# Patient Record
Sex: MALE | Race: WHITE | NOT HISPANIC OR LATINO | Employment: FULL TIME | URBAN - METROPOLITAN AREA
[De-identification: names, ages, dates, MRNs, and addresses within clinical notes are randomized per-mention and may not be internally consistent; named-entity substitution may affect disease eponyms.]

---

## 2019-12-21 ENCOUNTER — APPOINTMENT (OUTPATIENT)
Dept: CT IMAGING | Facility: HOSPITAL | Age: 54
End: 2019-12-21

## 2019-12-21 ENCOUNTER — HOSPITAL ENCOUNTER (EMERGENCY)
Facility: HOSPITAL | Age: 54
Discharge: HOME | End: 2019-12-21
Attending: EMERGENCY MEDICINE | Admitting: EMERGENCY MEDICINE

## 2019-12-21 ENCOUNTER — APPOINTMENT (OUTPATIENT)
Dept: GENERAL RADIOLOGY | Facility: HOSPITAL | Age: 54
End: 2019-12-21

## 2019-12-21 VITALS
BODY MASS INDEX: 28.17 KG/M2 | HEIGHT: 64 IN | WEIGHT: 165 LBS | RESPIRATION RATE: 16 BRPM | DIASTOLIC BLOOD PRESSURE: 80 MMHG | SYSTOLIC BLOOD PRESSURE: 140 MMHG | HEART RATE: 78 BPM | OXYGEN SATURATION: 96 % | TEMPERATURE: 98.3 F

## 2019-12-21 DIAGNOSIS — A08.4 VIRAL GASTROENTERITIS: Primary | ICD-10-CM

## 2019-12-21 LAB
ALBUMIN SERPL-MCNC: 4.4 G/DL (ref 3.5–5.2)
ALBUMIN/GLOB SERPL: 1.3 G/DL
ALP SERPL-CCNC: 92 U/L (ref 39–117)
ALT SERPL W P-5'-P-CCNC: 15 U/L (ref 1–41)
ANION GAP SERPL CALCULATED.3IONS-SCNC: 12 MMOL/L (ref 5–15)
AST SERPL-CCNC: 15 U/L (ref 1–40)
BACTERIA UR QL AUTO: ABNORMAL /HPF
BASOPHILS # BLD AUTO: 0.01 10*3/MM3 (ref 0–0.2)
BASOPHILS NFR BLD AUTO: 0.1 % (ref 0–1.5)
BILIRUB SERPL-MCNC: 1.2 MG/DL (ref 0.2–1.2)
BILIRUB UR QL STRIP: NEGATIVE
BUN BLD-MCNC: 17 MG/DL (ref 6–20)
BUN/CREAT SERPL: 16.2 (ref 7–25)
C DIFF TOX GENS STL QL NAA+PROBE: NOT DETECTED
CALCIUM SPEC-SCNC: 9.6 MG/DL (ref 8.6–10.5)
CHLORIDE SERPL-SCNC: 103 MMOL/L (ref 98–107)
CLARITY UR: ABNORMAL
CO2 SERPL-SCNC: 26 MMOL/L (ref 22–29)
COD CRY URNS QL: ABNORMAL /HPF
COLOR UR: ABNORMAL
CREAT BLD-MCNC: 1.05 MG/DL (ref 0.76–1.27)
D-LACTATE SERPL-SCNC: 0.9 MMOL/L (ref 0.5–2)
DEPRECATED RDW RBC AUTO: 39.4 FL (ref 37–54)
EOSINOPHIL # BLD AUTO: 0.1 10*3/MM3 (ref 0–0.4)
EOSINOPHIL NFR BLD AUTO: 1 % (ref 0.3–6.2)
ERYTHROCYTE [DISTWIDTH] IN BLOOD BY AUTOMATED COUNT: 12.3 % (ref 12.3–15.4)
GFR SERPL CREATININE-BSD FRML MDRD: 73 ML/MIN/1.73
GLOBULIN UR ELPH-MCNC: 3.3 GM/DL
GLUCOSE BLD-MCNC: 134 MG/DL (ref 65–99)
GLUCOSE UR STRIP-MCNC: NEGATIVE MG/DL
HCT VFR BLD AUTO: 51.5 % (ref 37.5–51)
HGB BLD-MCNC: 18 G/DL (ref 13–17.7)
HGB UR QL STRIP.AUTO: NEGATIVE
HOLD SPECIMEN: NORMAL
HOLD SPECIMEN: NORMAL
HYALINE CASTS UR QL AUTO: ABNORMAL /LPF
IMM GRANULOCYTES # BLD AUTO: 0.04 10*3/MM3 (ref 0–0.05)
IMM GRANULOCYTES NFR BLD AUTO: 0.4 % (ref 0–0.5)
KETONES UR QL STRIP: ABNORMAL
LEUKOCYTE ESTERASE UR QL STRIP.AUTO: NEGATIVE
LIPASE SERPL-CCNC: 29 U/L (ref 13–60)
LYMPHOCYTES # BLD AUTO: 0.95 10*3/MM3 (ref 0.7–3.1)
LYMPHOCYTES NFR BLD AUTO: 9.1 % (ref 19.6–45.3)
MCH RBC QN AUTO: 30.7 PG (ref 26.6–33)
MCHC RBC AUTO-ENTMCNC: 35 G/DL (ref 31.5–35.7)
MCV RBC AUTO: 87.9 FL (ref 79–97)
MONOCYTES # BLD AUTO: 0.72 10*3/MM3 (ref 0.1–0.9)
MONOCYTES NFR BLD AUTO: 6.9 % (ref 5–12)
MUCOUS THREADS URNS QL MICRO: ABNORMAL /HPF
NEUTROPHILS # BLD AUTO: 8.67 10*3/MM3 (ref 1.7–7)
NEUTROPHILS NFR BLD AUTO: 82.5 % (ref 42.7–76)
NITRITE UR QL STRIP: NEGATIVE
NRBC BLD AUTO-RTO: 0 /100 WBC (ref 0–0.2)
PH UR STRIP.AUTO: 5.5 [PH] (ref 5–8)
PLATELET # BLD AUTO: 204 10*3/MM3 (ref 140–450)
PMV BLD AUTO: 9.5 FL (ref 6–12)
POTASSIUM BLD-SCNC: 3.8 MMOL/L (ref 3.5–5.2)
PROT SERPL-MCNC: 7.7 G/DL (ref 6–8.5)
PROT UR QL STRIP: ABNORMAL
RBC # BLD AUTO: 5.86 10*6/MM3 (ref 4.14–5.8)
RBC # UR: ABNORMAL /HPF
REF LAB TEST METHOD: ABNORMAL
SODIUM BLD-SCNC: 141 MMOL/L (ref 136–145)
SP GR UR STRIP: 1.03 (ref 1–1.03)
SQUAMOUS #/AREA URNS HPF: ABNORMAL /HPF
TROPONIN T SERPL-MCNC: <0.01 NG/ML (ref 0–0.03)
UROBILINOGEN UR QL STRIP: ABNORMAL
WBC NRBC COR # BLD: 10.49 10*3/MM3 (ref 3.4–10.8)
WBC UR QL AUTO: ABNORMAL /HPF
WHOLE BLOOD HOLD SPECIMEN: NORMAL
WHOLE BLOOD HOLD SPECIMEN: NORMAL

## 2019-12-21 PROCEDURE — 83605 ASSAY OF LACTIC ACID: CPT | Performed by: EMERGENCY MEDICINE

## 2019-12-21 PROCEDURE — 96374 THER/PROPH/DIAG INJ IV PUSH: CPT

## 2019-12-21 PROCEDURE — 99284 EMERGENCY DEPT VISIT MOD MDM: CPT

## 2019-12-21 PROCEDURE — 74176 CT ABD & PELVIS W/O CONTRAST: CPT

## 2019-12-21 PROCEDURE — 85025 COMPLETE CBC W/AUTO DIFF WBC: CPT | Performed by: EMERGENCY MEDICINE

## 2019-12-21 PROCEDURE — 83690 ASSAY OF LIPASE: CPT | Performed by: EMERGENCY MEDICINE

## 2019-12-21 PROCEDURE — 84484 ASSAY OF TROPONIN QUANT: CPT | Performed by: EMERGENCY MEDICINE

## 2019-12-21 PROCEDURE — 81001 URINALYSIS AUTO W/SCOPE: CPT | Performed by: EMERGENCY MEDICINE

## 2019-12-21 PROCEDURE — 25010000002 PROMETHAZINE PER 50 MG: Performed by: PHYSICIAN ASSISTANT

## 2019-12-21 PROCEDURE — 93005 ELECTROCARDIOGRAM TRACING: CPT | Performed by: EMERGENCY MEDICINE

## 2019-12-21 PROCEDURE — 80053 COMPREHEN METABOLIC PANEL: CPT | Performed by: EMERGENCY MEDICINE

## 2019-12-21 PROCEDURE — 96375 TX/PRO/DX INJ NEW DRUG ADDON: CPT

## 2019-12-21 PROCEDURE — 87493 C DIFF AMPLIFIED PROBE: CPT | Performed by: PHYSICIAN ASSISTANT

## 2019-12-21 PROCEDURE — 71045 X-RAY EXAM CHEST 1 VIEW: CPT

## 2019-12-21 RX ORDER — PROMETHAZINE HYDROCHLORIDE 25 MG/ML
12.5 INJECTION, SOLUTION INTRAMUSCULAR; INTRAVENOUS ONCE
Status: COMPLETED | OUTPATIENT
Start: 2019-12-21 | End: 2019-12-21

## 2019-12-21 RX ORDER — SODIUM CHLORIDE 0.9 % (FLUSH) 0.9 %
10 SYRINGE (ML) INJECTION AS NEEDED
Status: DISCONTINUED | OUTPATIENT
Start: 2019-12-21 | End: 2019-12-21 | Stop reason: HOSPADM

## 2019-12-21 RX ORDER — ONDANSETRON 4 MG/1
4 TABLET, ORALLY DISINTEGRATING ORAL EVERY 8 HOURS PRN
Qty: 12 TABLET | Refills: 0 | Status: SHIPPED | OUTPATIENT
Start: 2019-12-21

## 2019-12-21 RX ORDER — ATENOLOL 25 MG/1
25 TABLET ORAL DAILY
COMMUNITY

## 2019-12-21 RX ORDER — OMEPRAZOLE 20 MG/1
20 CAPSULE, DELAYED RELEASE ORAL DAILY
COMMUNITY

## 2019-12-21 RX ORDER — FAMOTIDINE 10 MG/ML
20 INJECTION, SOLUTION INTRAVENOUS ONCE
Status: COMPLETED | OUTPATIENT
Start: 2019-12-21 | End: 2019-12-21

## 2019-12-21 RX ORDER — LOSARTAN POTASSIUM 50 MG/1
50 TABLET ORAL DAILY
COMMUNITY

## 2019-12-21 RX ADMIN — PROMETHAZINE HYDROCHLORIDE 12.5 MG: 25 INJECTION INTRAMUSCULAR; INTRAVENOUS at 08:22

## 2019-12-21 RX ADMIN — FAMOTIDINE 20 MG: 10 INJECTION, SOLUTION INTRAVENOUS at 08:22

## 2019-12-21 RX ADMIN — SODIUM CHLORIDE 1000 ML: 9 INJECTION, SOLUTION INTRAVENOUS at 08:21

## 2019-12-21 NOTE — ED PROVIDER NOTES
Subjective   Mr. Moore is a 54-year-old male that presents to the emergency room today with nausea vomiting and diarrhea.  He states that is been going on for about 3 days.  He is been having more diarrhea than anything at this point.  He states that he is been having more than 20 episodes of diarrhea a day.  He has not been on any recent antibiotics, he has not had a prior history of C. difficile.  He does report having abdominal pain diffusely more in the epigastrium.  Nausea and vomiting is been minimal at this point.  He has tried Zofran and Phenergan without any relief.  No blood in his stools, no black tarry stools, no prior history of EGD.  He is on a PPI.      History provided by:  Patient   used: No    Diarrhea   The primary symptoms include abdominal pain, nausea, vomiting and diarrhea. Primary symptoms do not include fever, jaundice, hematochezia, dysuria, arthralgias or rash. The illness began 3 to 5 days ago. The onset was sudden.   The illness is also significant for bloating. The illness does not include chills, dysphagia, odynophagia, constipation, back pain or itching. Associated medical issues do not include inflammatory bowel disease, liver disease, alcohol abuse, PUD, irritable bowel syndrome or diverticulitis.       Review of Systems   Constitutional: Negative for chills and fever.   Respiratory: Negative for chest tightness, shortness of breath and wheezing.    Cardiovascular: Negative for chest pain and palpitations.   Gastrointestinal: Positive for abdominal pain, bloating, diarrhea, nausea and vomiting. Negative for constipation, dysphagia, hematochezia and jaundice.   Genitourinary: Negative for dysuria, frequency and hematuria.   Musculoskeletal: Negative for arthralgias, back pain and neck pain.   Skin: Negative for itching and rash.   Hematological: Negative for adenopathy.   Psychiatric/Behavioral: Negative.    All other systems reviewed and are negative.      Past  Medical History:   Diagnosis Date   • GERD (gastroesophageal reflux disease)    • Hypertension        No Known Allergies    Past Surgical History:   Procedure Laterality Date   • ANTERIOR CERVICAL DISCECTOMY         History reviewed. No pertinent family history.    Social History     Socioeconomic History   • Marital status:      Spouse name: Not on file   • Number of children: Not on file   • Years of education: Not on file   • Highest education level: Not on file   Tobacco Use   • Smoking status: Never Smoker   • Smokeless tobacco: Never Used   Substance and Sexual Activity   • Alcohol use: Never     Frequency: Never   • Drug use: Never           Objective   Physical Exam   Constitutional: He is oriented to person, place, and time. He appears well-developed and well-nourished.   HENT:   Head: Normocephalic and atraumatic.   Right Ear: External ear normal.   Left Ear: External ear normal.   Nose: Nose normal.   Mouth/Throat: Oropharynx is clear and moist.   Eyes: Pupils are equal, round, and reactive to light. Conjunctivae and EOM are normal. No scleral icterus.   Neck: Normal range of motion. No thyromegaly present.   Cardiovascular: Normal rate, regular rhythm and normal heart sounds.   Pulmonary/Chest: Effort normal and breath sounds normal. No respiratory distress. He has no wheezes. He has no rales. He exhibits no tenderness.   Abdominal: Soft. Bowel sounds are normal. He exhibits no distension. There is tenderness (diffuse non focal tenderness, negavite tomlinson's sign. ).   Musculoskeletal: Normal range of motion.   Lymphadenopathy:     He has no cervical adenopathy.   Neurological: He is alert and oriented to person, place, and time. He has normal reflexes. He displays normal reflexes. No cranial nerve deficit. Coordination normal.   Skin: Skin is warm and dry. Capillary refill takes less than 2 seconds.   Psychiatric: He has a normal mood and affect. His behavior is normal. Judgment and thought  content normal.   Nursing note and vitals reviewed.      Procedures           ED Course  ED Course as of Dec 22 0728   Sat Dec 21, 2019   1115 CXR no infiltrate, no effusion.  CT abd/pelvis no free air, no obstruction.      [RENE]   1116 Reviewed findings with the patient and the patient's family.  Is able to keep fluids down here in the emergency department.    [RENE]      ED Course User Index  [RENE] [place] PA           Recent Results (from the past 24 hour(s))   Comprehensive Metabolic Panel    Collection Time: 12/21/19  7:30 AM   Result Value Ref Range    Glucose 134 (H) 65 - 99 mg/dL    BUN 17 6 - 20 mg/dL    Creatinine 1.05 0.76 - 1.27 mg/dL    Sodium 141 136 - 145 mmol/L    Potassium 3.8 3.5 - 5.2 mmol/L    Chloride 103 98 - 107 mmol/L    CO2 26.0 22.0 - 29.0 mmol/L    Calcium 9.6 8.6 - 10.5 mg/dL    Total Protein 7.7 6.0 - 8.5 g/dL    Albumin 4.40 3.50 - 5.20 g/dL    ALT (SGPT) 15 1 - 41 U/L    AST (SGOT) 15 1 - 40 U/L    Alkaline Phosphatase 92 39 - 117 U/L    Total Bilirubin 1.2 0.2 - 1.2 mg/dL    eGFR Non African Amer 73 >60 mL/min/1.73    Globulin 3.3 gm/dL    A/G Ratio 1.3 g/dL    BUN/Creatinine Ratio 16.2 7.0 - 25.0    Anion Gap 12.0 5.0 - 15.0 mmol/L   Lipase    Collection Time: 12/21/19  7:30 AM   Result Value Ref Range    Lipase 29 13 - 60 U/L   Troponin    Collection Time: 12/21/19  7:30 AM   Result Value Ref Range    Troponin T <0.010 0.000 - 0.030 ng/mL   Light Blue Top    Collection Time: 12/21/19  7:30 AM   Result Value Ref Range    Extra Tube hold for add-on    Green Top (Gel)    Collection Time: 12/21/19  7:30 AM   Result Value Ref Range    Extra Tube Hold for add-ons.    Lavender Top    Collection Time: 12/21/19  7:30 AM   Result Value Ref Range    Extra Tube hold for add-on    Gold Top - SST    Collection Time: 12/21/19  7:30 AM   Result Value Ref Range    Extra Tube Hold for add-ons.    CBC Auto Differential    Collection Time: 12/21/19  7:30 AM   Result Value Ref Range    WBC  10.49 3.40 - 10.80 10*3/mm3    RBC 5.86 (H) 4.14 - 5.80 10*6/mm3    Hemoglobin 18.0 (H) 13.0 - 17.7 g/dL    Hematocrit 51.5 (H) 37.5 - 51.0 %    MCV 87.9 79.0 - 97.0 fL    MCH 30.7 26.6 - 33.0 pg    MCHC 35.0 31.5 - 35.7 g/dL    RDW 12.3 12.3 - 15.4 %    RDW-SD 39.4 37.0 - 54.0 fl    MPV 9.5 6.0 - 12.0 fL    Platelets 204 140 - 450 10*3/mm3    Neutrophil % 82.5 (H) 42.7 - 76.0 %    Lymphocyte % 9.1 (L) 19.6 - 45.3 %    Monocyte % 6.9 5.0 - 12.0 %    Eosinophil % 1.0 0.3 - 6.2 %    Basophil % 0.1 0.0 - 1.5 %    Immature Grans % 0.4 0.0 - 0.5 %    Neutrophils, Absolute 8.67 (H) 1.70 - 7.00 10*3/mm3    Lymphocytes, Absolute 0.95 0.70 - 3.10 10*3/mm3    Monocytes, Absolute 0.72 0.10 - 0.90 10*3/mm3    Eosinophils, Absolute 0.10 0.00 - 0.40 10*3/mm3    Basophils, Absolute 0.01 0.00 - 0.20 10*3/mm3    Immature Grans, Absolute 0.04 0.00 - 0.05 10*3/mm3    nRBC 0.0 0.0 - 0.2 /100 WBC   Lactic Acid, Plasma    Collection Time: 12/21/19  7:30 AM   Result Value Ref Range    Lactate 0.9 0.5 - 2.0 mmol/L   Urinalysis With Microscopic If Indicated (No Culture) - Urine, Clean Catch    Collection Time: 12/21/19  8:18 AM   Result Value Ref Range    Color, UA Dark Yellow (A) Yellow, Straw    Appearance, UA Cloudy (A) Clear    pH, UA 5.5 5.0 - 8.0    Specific Gravity, UA 1.028 1.001 - 1.030    Glucose, UA Negative Negative    Ketones, UA Trace (A) Negative    Bilirubin, UA Negative Negative    Blood, UA Negative Negative    Protein, UA Trace (A) Negative    Leuk Esterase, UA Negative Negative    Nitrite, UA Negative Negative    Urobilinogen, UA 1.0 E.U./dL 0.2 - 1.0 E.U./dL   Urinalysis, Microscopic Only - Urine, Clean Catch    Collection Time: 12/21/19  8:18 AM   Result Value Ref Range    RBC, UA 0-2 None Seen, 0-2 /HPF    WBC, UA 0-2 None Seen, 0-2 /HPF    Bacteria, UA None Seen None Seen, Trace /HPF    Squamous Epithelial Cells, UA 0-2 None Seen, 0-2 /HPF    Hyaline Casts, UA 7-12 0 - 6 /LPF    Calcium Oxalate Crystals, UA Large/3+  None Seen /HPF    Mucus, UA Large/3+ (A) None Seen, Trace /HPF    Methodology Manual Light Microscopy    Clostridium Difficile Toxin, PCR - Stool, Per Rectum    Collection Time: 12/21/19  8:19 AM   Result Value Ref Range    C. Difficile Toxins by PCR Not Detected Not Detected     Note: In addition to lab results from this visit, the labs listed above may include labs taken at another facility or during a different encounter within the last 24 hours. Please correlate lab times with ED admission and discharge times for further clarification of the services performed during this visit.    CT Abdomen Pelvis Without Contrast   Preliminary Result       Nonspecific fluid throughout the colon with scattered air-fluid levels   noted in the colon suggestive of possibly the diarrheal state. There is   no convincing evidence for colitis identified at this time.       Small hiatal hernia with mild distal gastroesophageal wall thickening.   Consider follow-up endoscopy for further evaluation.       Prostate gland is mildly enlarged with coarse prostatic calcifications.   There is a centrally located calcification within the prostate in which   an underlying prosthetic urethral calculus is not excluded, although   correlate with physical exam and symptomatology as this likely relates   to a calcification within the adjacent prosthetic parenchyma.          XR Chest 1 View   Preliminary Result   No evidence of active airspace disease.       DICTATED:   12/21/2019   EDITED/ls :   12/21/2019             Vitals:    12/21/19 1000 12/21/19 1002 12/21/19 1015 12/21/19 1130   BP: 133/81  132/78 140/80   BP Location:       Patient Position:       Pulse:  79 74 78   Resp:   16 16   Temp:       TempSrc:       SpO2:  95% 95% 96%   Weight:       Height:         Medications   promethazine (PHENERGAN) injection 12.5 mg (12.5 mg Intravenous Given 12/21/19 0822)   famotidine (PEPCID) injection 20 mg (20 mg Intravenous Given 12/21/19 0822)   sodium  chloride 0.9 % bolus 1,000 mL (0 mL Intravenous Stopped 12/21/19 1009)     ECG/EMG Results (last 24 hours)     Procedure Component Value Units Date/Time    ECG 12 Lead [[id number]] Collected:  12/21/19 0717     Updated:  12/21/19 0718        ECG 12 Lead                        No data recorded                        MDM  Number of Diagnoses or Management Options  Viral gastroenteritis: new and requires workup     Amount and/or Complexity of Data Reviewed  Clinical lab tests: reviewed and ordered  Tests in the radiology section of CPT®: reviewed and ordered  Tests in the medicine section of CPT®: ordered and reviewed  Discuss the patient with other providers: yes    Patient Progress  Patient progress: stable      Final diagnoses:   Viral gastroenteritis              D[place] PA  12/22/19 0728

## 2024-12-02 ENCOUNTER — LAB (OUTPATIENT)
Dept: LAB | Facility: HOSPITAL | Age: 59
End: 2024-12-02
Payer: COMMERCIAL

## 2024-12-02 ENCOUNTER — OFFICE VISIT (OUTPATIENT)
Dept: INTERNAL MEDICINE | Facility: CLINIC | Age: 59
End: 2024-12-02
Payer: COMMERCIAL

## 2024-12-02 VITALS
HEIGHT: 64 IN | BODY MASS INDEX: 29.02 KG/M2 | RESPIRATION RATE: 16 BRPM | WEIGHT: 170 LBS | DIASTOLIC BLOOD PRESSURE: 80 MMHG | SYSTOLIC BLOOD PRESSURE: 136 MMHG | OXYGEN SATURATION: 99 % | HEART RATE: 65 BPM

## 2024-12-02 DIAGNOSIS — Z76.89 ENCOUNTER TO ESTABLISH CARE WITH NEW DOCTOR: ICD-10-CM

## 2024-12-02 DIAGNOSIS — Z13.21 SCREENING FOR ENDOCRINE, NUTRITIONAL, METABOLIC AND IMMUNITY DISORDER: ICD-10-CM

## 2024-12-02 DIAGNOSIS — Z11.59 NEED FOR HEPATITIS C SCREENING TEST: ICD-10-CM

## 2024-12-02 DIAGNOSIS — Z12.5 SCREENING PSA (PROSTATE SPECIFIC ANTIGEN): ICD-10-CM

## 2024-12-02 DIAGNOSIS — Z13.0 SCREENING FOR ENDOCRINE, NUTRITIONAL, METABOLIC AND IMMUNITY DISORDER: ICD-10-CM

## 2024-12-02 DIAGNOSIS — Z13.228 SCREENING FOR ENDOCRINE, NUTRITIONAL, METABOLIC AND IMMUNITY DISORDER: ICD-10-CM

## 2024-12-02 DIAGNOSIS — I10 ESSENTIAL HYPERTENSION: ICD-10-CM

## 2024-12-02 DIAGNOSIS — Z13.29 SCREENING FOR ENDOCRINE, NUTRITIONAL, METABOLIC AND IMMUNITY DISORDER: ICD-10-CM

## 2024-12-02 DIAGNOSIS — Z13.220 SCREENING, LIPID: ICD-10-CM

## 2024-12-02 DIAGNOSIS — Z00.00 WELLNESS EXAMINATION: Primary | ICD-10-CM

## 2024-12-02 PROBLEM — N20.0 KIDNEY STONE: Status: ACTIVE | Noted: 2017-07-11

## 2024-12-02 PROBLEM — F41.9 ANXIETY AND DEPRESSION: Status: ACTIVE | Noted: 2024-12-02

## 2024-12-02 PROBLEM — F32.A ANXIETY AND DEPRESSION: Status: ACTIVE | Noted: 2024-12-02

## 2024-12-02 PROBLEM — J30.9 ALLERGIC RHINITIS: Status: ACTIVE | Noted: 2017-07-11

## 2024-12-02 PROBLEM — Z87.442 HISTORY OF KIDNEY STONES: Status: ACTIVE | Noted: 2017-07-11

## 2024-12-02 PROBLEM — E78.5 DYSLIPIDEMIA: Status: ACTIVE | Noted: 2018-11-15

## 2024-12-02 PROBLEM — K21.9 GASTROESOPHAGEAL REFLUX DISEASE WITHOUT ESOPHAGITIS: Status: ACTIVE | Noted: 2017-07-11

## 2024-12-02 LAB
ALBUMIN SERPL-MCNC: 4.4 G/DL (ref 3.5–5.2)
ALBUMIN/GLOB SERPL: 1.5 G/DL
ALP SERPL-CCNC: 106 U/L (ref 39–117)
ALT SERPL W P-5'-P-CCNC: 19 U/L (ref 1–41)
ANION GAP SERPL CALCULATED.3IONS-SCNC: 9 MMOL/L (ref 5–15)
AST SERPL-CCNC: 22 U/L (ref 1–40)
BILIRUB SERPL-MCNC: 0.8 MG/DL (ref 0–1.2)
BUN SERPL-MCNC: 12 MG/DL (ref 8–23)
BUN/CREAT SERPL: 10.9 (ref 7–25)
CALCIUM SPEC-SCNC: 9.4 MG/DL (ref 8.6–10.5)
CHLORIDE SERPL-SCNC: 106 MMOL/L (ref 98–107)
CHOLEST SERPL-MCNC: 195 MG/DL (ref 0–200)
CO2 SERPL-SCNC: 25 MMOL/L (ref 22–29)
CREAT SERPL-MCNC: 1.1 MG/DL (ref 0.76–1.27)
DEPRECATED RDW RBC AUTO: 42.4 FL (ref 37–54)
EGFRCR SERPLBLD CKD-EPI 2021: 76.4 ML/MIN/1.73
ERYTHROCYTE [DISTWIDTH] IN BLOOD BY AUTOMATED COUNT: 13 % (ref 12.3–15.4)
GLOBULIN UR ELPH-MCNC: 3 GM/DL
GLUCOSE SERPL-MCNC: 95 MG/DL (ref 65–99)
HBA1C MFR BLD: 5.5 % (ref 4.8–5.6)
HCT VFR BLD AUTO: 48.7 % (ref 37.5–51)
HCV AB SER QL: NORMAL
HDLC SERPL-MCNC: 41 MG/DL (ref 40–60)
HGB BLD-MCNC: 16.3 G/DL (ref 13–17.7)
LDLC SERPL CALC-MCNC: 138 MG/DL (ref 0–100)
LDLC/HDLC SERPL: 3.32 {RATIO}
MCH RBC QN AUTO: 30.1 PG (ref 26.6–33)
MCHC RBC AUTO-ENTMCNC: 33.5 G/DL (ref 31.5–35.7)
MCV RBC AUTO: 90 FL (ref 79–97)
PLATELET # BLD AUTO: 187 10*3/MM3 (ref 140–450)
PMV BLD AUTO: 10.1 FL (ref 6–12)
POTASSIUM SERPL-SCNC: 4.1 MMOL/L (ref 3.5–5.2)
PROT SERPL-MCNC: 7.4 G/DL (ref 6–8.5)
PSA SERPL-MCNC: 0.8 NG/ML (ref 0–4)
RBC # BLD AUTO: 5.41 10*6/MM3 (ref 4.14–5.8)
SODIUM SERPL-SCNC: 140 MMOL/L (ref 136–145)
TRIGL SERPL-MCNC: 90 MG/DL (ref 0–150)
TSH SERPL DL<=0.05 MIU/L-ACNC: 1.65 UIU/ML (ref 0.27–4.2)
VLDLC SERPL-MCNC: 16 MG/DL (ref 5–40)
WBC NRBC COR # BLD AUTO: 6.36 10*3/MM3 (ref 3.4–10.8)

## 2024-12-02 PROCEDURE — 86803 HEPATITIS C AB TEST: CPT

## 2024-12-02 PROCEDURE — 80061 LIPID PANEL: CPT

## 2024-12-02 PROCEDURE — 99386 PREV VISIT NEW AGE 40-64: CPT | Performed by: FAMILY MEDICINE

## 2024-12-02 PROCEDURE — G0103 PSA SCREENING: HCPCS

## 2024-12-02 PROCEDURE — 83036 HEMOGLOBIN GLYCOSYLATED A1C: CPT

## 2024-12-02 PROCEDURE — 80050 GENERAL HEALTH PANEL: CPT

## 2024-12-02 RX ORDER — IPRATROPIUM BROMIDE 42 UG/1
2 SPRAY, METERED NASAL 3 TIMES DAILY
COMMUNITY
Start: 2024-06-26

## 2024-12-02 RX ORDER — ATENOLOL 25 MG/1
25 TABLET ORAL DAILY
Qty: 90 TABLET | Refills: 3 | Status: SHIPPED | OUTPATIENT
Start: 2024-12-02

## 2024-12-02 RX ORDER — LOSARTAN POTASSIUM 50 MG/1
50 TABLET ORAL DAILY
Qty: 90 TABLET | Refills: 3 | Status: SHIPPED | OUTPATIENT
Start: 2024-12-02

## 2024-12-02 RX ORDER — OLOPATADINE HYDROCHLORIDE AND MOMETASONE FUROATE 25; 665 UG/1; UG/1
2 SPRAY, METERED NASAL 2 TIMES DAILY
COMMUNITY
Start: 2024-06-26

## 2024-12-02 NOTE — PATIENT INSTRUCTIONS
Diagnosis Discussed   Continue to monitor   Plenty of fluids, monitor diet and exercise   Labs ordered will notify of results   Immunizations up to date   Follow up with Dermatology   Follow up with Gastroenterology as directed- will get record of colonoscopy   Take medications as instructed- refills today   Follow up as directed   If symptoms worsen or persist please seek further evaluation

## 2024-12-02 NOTE — PROGRESS NOTES
Office Note     Name: Ayush Moore    : 1965     MRN: [id number]     Chief Complaint  Establish Care    Subjective     History of Present Illness:  Ayush Moore is a 59 y.o. male who presents today for physical and establish care   Is currently fasting     Some mild anxiety/depression- stress  Catastrophic thinking   Has worked with therapist in the past- no real benefit   Would like to hold on referral for now  Will reach out if worsening    PMH-  Allergic rhinitis   HTN   HPL   GERD   Hx of kidney stones   Headaches   Anxiety/depression     Meds-  Atenolol 25mg daily   Vitamin D daily   Losartan 50mg daily   Prilosec 20mg daily   Zofran as needed     Family Hx-   Maternal GM- - unsure   Maternal GF- - unsure   Paternal GM- - unsure   Paternal GF- - unsure   Mother- alive- rheumatoid arthritis   Father- alive- HTN, HPL, heart disease   Brother- alive- Migraines   Sister- alive- depression/anxiety     Alcohol 1 per week   Smoking - none - previous chew 1 can every 2-3 days x 20 years- quit   Drug use - none   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 Will think about it   Sun Exposure - protects skin, Follows with dermatology annually     Dental/Eye exams -up to date. Vision is stable   Diet/Exercise- Diet- Healthy- moderate. Does eat out but healthier decisions   Exercise- limited. Walking dog.     Colonoscopy/Cologuard - every 7 years.   Completed within the last few years   Will request records     Immunizations  Tdap- up to date   Flu- postponed   Shingles- will hold   COVID- completed no recent   PNA- not yet       Review of Systems:   Review of Systems   Constitutional:  Negative for chills and fever.   HENT:  Negative for dental problem, trouble swallowing and voice change.    Eyes:  Negative for visual disturbance.   Respiratory:  Negative for cough, chest tightness, shortness of breath and wheezing.    Cardiovascular:   Negative for chest pain, palpitations and leg swelling.   Gastrointestinal:  Positive for GERD. Negative for abdominal pain, blood in stool, constipation, nausea and vomiting.   Genitourinary:  Negative for dysuria.   Musculoskeletal:  Negative for gait problem.   Skin:  Negative for rash.   Neurological:  Negative for light-headedness and headache.   Psychiatric/Behavioral:  Positive for dysphoric mood and stress. Negative for self-injury, sleep disturbance and suicidal ideas. The patient is nervous/anxious.        Past Medical History:   Past Medical History:   Diagnosis Date    GERD (gastroesophageal reflux disease)     Hypertension        Past Surgical History:   Past Surgical History:   Procedure Laterality Date    ANTERIOR CERVICAL DISCECTOMY         Immunizations:   Immunization History   Administered Date(s) Administered    COVID-19 (PFIZER) Purple Cap Monovalent 02/19/2021, 03/12/2021    Fluzone (or Fluarix & Flulaval for VFC) >6mos 11/19/2019, 11/20/2020    Hepatitis A 12/18/2018, 07/10/2019    Tdap 09/28/2020        Medications:     Current Outpatient Medications:     atenolol (TENORMIN) 25 MG tablet, Take 1 tablet by mouth Daily., Disp: 90 tablet, Rfl: 3    Cholecalciferol (VITAMIN D3) 125 MCG (5000 UT) capsule capsule, Take 1 capsule by mouth Daily., Disp: , Rfl:     ipratropium (ATROVENT) 0.06 % nasal spray, Administer 2 sprays into the nostril(s) as directed by provider 3 (Three) Times a Day., Disp: , Rfl:     losartan (COZAAR) 50 MG tablet, Take 1 tablet by mouth Daily., Disp: 90 tablet, Rfl: 3    Olopatadine-Mometasone (Ryaltris) 665-25 MCG/ACT suspension, 2 sprays 2 (Two) Times a Day., Disp: , Rfl:     omeprazole (priLOSEC) 20 MG capsule, Take 1 capsule by mouth Daily., Disp: , Rfl:     ondansetron ODT (ZOFRAN-ODT) 4 MG disintegrating tablet, Take 1 tablet by mouth Every 8 (Eight) Hours As Needed for Nausea or Vomiting., Disp: 12 tablet, Rfl: 0    Allergies:   Allergies   Allergen Reactions     "Lisinopril Cough       Family History:   Family History   Problem Relation Age of Onset    Rheum arthritis Mother     Hypertension Father     Hyperlipidemia Father     Heart disease Father     Anxiety disorder Sister     Depression Sister     Migraines Brother        Social History:   Social History     Socioeconomic History    Marital status:    Tobacco Use    Smoking status: Never    Smokeless tobacco: Former     Types: Chew     Quit date: 2000    Tobacco comments:     1 can every 2-3 days x 20 years    Vaping Use    Vaping status: Never Used   Substance and Sexual Activity    Alcohol use: Yes     Alcohol/week: 1.0 standard drink of alcohol     Types: 1 Cans of beer per week    Drug use: Never         Objective     Vital Signs  /80   Pulse 65   Resp 16   Ht 162.6 cm (64\")   Wt 77.1 kg (170 lb)   SpO2 99%   BMI 29.18 kg/m²   Estimated body mass index is 29.18 kg/m² as calculated from the following:    Height as of this encounter: 162.6 cm (64\").    Weight as of this encounter: 77.1 kg (170 lb).      Physical Exam  Vitals and nursing note reviewed.   Constitutional:       General: He is not in acute distress.     Appearance: Normal appearance.   HENT:      Head: Normocephalic and atraumatic.      Right Ear: Tympanic membrane normal.      Left Ear: Tympanic membrane normal.      Nose: Nose normal.      Mouth/Throat:      Mouth: Mucous membranes are moist.   Eyes:      Extraocular Movements: Extraocular movements intact.      Conjunctiva/sclera: Conjunctivae normal.      Pupils: Pupils are equal, round, and reactive to light.   Cardiovascular:      Rate and Rhythm: Normal rate and regular rhythm.      Heart sounds: Normal heart sounds.   Pulmonary:      Effort: Pulmonary effort is normal. No respiratory distress.      Breath sounds: Normal breath sounds.   Abdominal:      General: Bowel sounds are normal.      Palpations: Abdomen is soft.   Musculoskeletal:         General: Normal range of motion. "      Cervical back: Normal range of motion.      Comments: Moving all extremities    Skin:     General: Skin is warm and dry.   Neurological:      General: No focal deficit present.      Mental Status: He is alert and oriented to person, place, and time.   Psychiatric:         Mood and Affect: Mood normal.         Behavior: Behavior normal.         Thought Content: Thought content normal.         Judgment: Judgment normal.          Procedures     Assessment and Plan   Diagnosis Discussed   Continue to monitor   Plenty of fluids, monitor diet and exercise   Labs ordered will notify of results   Immunizations up to date   Follow up with Dermatology   Follow up with Gastroenterology as directed- will get record of colonoscopy   Take medications as instructed- refills today   Consider behavioral health referral as discussed   Follow up as directed   If symptoms worsen or persist please seek further evaluation     1. Wellness examination    2. Encounter to establish care with new doctor  Reviewed PMH, medications and specialists     3. Screening for endocrine, nutritional, metabolic and immunity disorder  - CBC (No Diff); Future  - Comprehensive Metabolic Panel; Future  - Hemoglobin A1c; Future  - TSH Rfx On Abnormal To Free T4; Future    4. Screening, lipid  - Lipid Panel; Future    5. Screening PSA (prostate specific antigen)  - PSA Screen; Future    6. Need for hepatitis C screening test  - Hepatitis C Antibody; Future    7. Essential hypertension  - atenolol (TENORMIN) 25 MG tablet; Take 1 tablet by mouth Daily.  Dispense: 90 tablet; Refill: 3  - losartan (COZAAR) 50 MG tablet; Take 1 tablet by mouth Daily.  Dispense: 90 tablet; Refill: 3       Follow Up  Return in about 1 year (around 12/2/2025), or if symptoms worsen or fail to improve, for Annual, fasting labs.    Destiny Rai MD  [facility]  [address] [place] [id number] [phone]

## 2024-12-05 ENCOUNTER — PATIENT ROUNDING (BHMG ONLY) (OUTPATIENT)
Dept: INTERNAL MEDICINE | Facility: CLINIC | Age: 59
End: 2024-12-05
Payer: COMMERCIAL

## 2024-12-05 NOTE — PROGRESS NOTES
A My-chart message has been sent to the patient for Patient Rounding with [facility].

## 2025-05-22 ENCOUNTER — OFFICE VISIT (OUTPATIENT)
Dept: INTERNAL MEDICINE | Facility: CLINIC | Age: 60
End: 2025-05-22
Payer: COMMERCIAL

## 2025-05-22 VITALS
OXYGEN SATURATION: 99 % | SYSTOLIC BLOOD PRESSURE: 138 MMHG | BODY MASS INDEX: 26.94 KG/M2 | RESPIRATION RATE: 12 BRPM | HEIGHT: 64 IN | HEART RATE: 66 BPM | DIASTOLIC BLOOD PRESSURE: 80 MMHG | WEIGHT: 157.8 LBS | TEMPERATURE: 97 F

## 2025-05-22 DIAGNOSIS — J34.89 NASAL SORE: Primary | ICD-10-CM

## 2025-05-22 DIAGNOSIS — R43.1 ABNORMAL SMELL: ICD-10-CM

## 2025-05-22 DIAGNOSIS — I10 ESSENTIAL HYPERTENSION: ICD-10-CM

## 2025-05-22 PROCEDURE — 99214 OFFICE O/P EST MOD 30 MIN: CPT | Performed by: FAMILY MEDICINE

## 2025-05-22 RX ORDER — MUPIROCIN 20 MG/G
1 OINTMENT TOPICAL 2 TIMES DAILY
Qty: 22 G | Refills: 1 | Status: SHIPPED | OUTPATIENT
Start: 2025-05-22 | End: 2025-06-01

## 2025-05-22 RX ORDER — FAMOTIDINE 20 MG/1
20 TABLET, FILM COATED ORAL 2 TIMES DAILY
COMMUNITY

## 2025-05-22 NOTE — PATIENT INSTRUCTIONS
Diagnosis Discussed   Continue to monitor   Plenty of fluids  Please complete full course of antibiotics - topical ointment   Follow up with ENT as directed as discussed   If symptoms worsen or persist please seek further evaluation

## 2025-05-22 NOTE — PROGRESS NOTES
Office Note     Name: Ayush Moore    : 1965     MRN: [id number]     Chief Complaint  Pain in nose    Subjective     History of Present Illness:  Ayush Moore is a 59 y.o. male who presents today for pain in nose- at end of nose- hurts  No scabbing   Clear drainage- nasal congestion   No bleeding     Has used OTC- neosporin x 3 weeks- some mild relief   Not taking it away     Has not been in ENT recently       Review of Systems:   Review of Systems   Constitutional:  Negative for chills and fever.   HENT:  Positive for facial swelling. Negative for nosebleeds, postnasal drip, rhinorrhea, sinus pressure, trouble swallowing and voice change.    Respiratory:  Negative for cough and shortness of breath.    Cardiovascular:  Negative for chest pain, palpitations and leg swelling.   Gastrointestinal:  Negative for abdominal pain, nausea and vomiting.   Neurological:  Negative for light-headedness and headache.       Past Medical History:   Past Medical History:   Diagnosis Date    GERD (gastroesophageal reflux disease)     Hypertension        Past Surgical History:   Past Surgical History:   Procedure Laterality Date    ANTERIOR CERVICAL DISCECTOMY         Immunizations:   Immunization History   Administered Date(s) Administered    COVID-19 (PFIZER) Purple Cap Monovalent 2021, 2021    Fluzone (or Fluarix & Flulaval for VFC) >6mos 2019, 2020    Hepatitis A 2018, 07/10/2019    Tdap 2020        Medications:     Current Outpatient Medications:     atenolol (TENORMIN) 25 MG tablet, Take 1 tablet by mouth Daily., Disp: 90 tablet, Rfl: 3    Cholecalciferol (VITAMIN D3) 125 MCG (5000 UT) capsule capsule, Take 1 capsule by mouth Daily., Disp: , Rfl:     famotidine (PEPCID) 20 MG tablet, Take 1 tablet by mouth 2 (Two) Times a Day., Disp: , Rfl:     ipratropium (ATROVENT) 0.06 % nasal spray, Administer 2 sprays into the nostril(s) as directed by provider 3 (Three) Times a Day., Disp: ,  "Rfl:     losartan (COZAAR) 50 MG tablet, Take 1 tablet by mouth Daily., Disp: 90 tablet, Rfl: 3    Olopatadine-Mometasone (Ryaltris) 665-25 MCG/ACT suspension, 2 sprays 2 (Two) Times a Day., Disp: , Rfl:     ondansetron ODT (ZOFRAN-ODT) 4 MG disintegrating tablet, Take 1 tablet by mouth Every 8 (Eight) Hours As Needed for Nausea or Vomiting., Disp: 12 tablet, Rfl: 0    mupirocin (BACTROBAN) 2 % ointment, Apply 1 Application topically to the appropriate area as directed 2 (Two) Times a Day for 10 days. To wound area until healed, Disp: 22 g, Rfl: 1    omeprazole (priLOSEC) 20 MG capsule, Take 1 capsule by mouth Daily. (Patient not taking: Reported on 5/22/2025), Disp: , Rfl:     Allergies:   Allergies   Allergen Reactions    Lisinopril Cough       Family History:   Family History   Problem Relation Age of Onset    Rheum arthritis Mother     Hypertension Father     Hyperlipidemia Father     Heart disease Father     Anxiety disorder Sister     Depression Sister     Migraines Brother        Social History:   Social History     Socioeconomic History    Marital status:    Tobacco Use    Smoking status: Never    Smokeless tobacco: Former     Types: Chew     Quit date: 2000    Tobacco comments:     1 can every 2-3 days x 20 years    Vaping Use    Vaping status: Never Used   Substance and Sexual Activity    Alcohol use: Yes     Alcohol/week: 1.0 standard drink of alcohol     Types: 1 Cans of beer per week    Drug use: Never         Objective     Vital Signs  /80   Pulse 66   Temp 97 °F (36.1 °C) (Temporal)   Resp 12   Ht 162.6 cm (64.02\")   Wt 71.6 kg (157 lb 12.8 oz)   SpO2 99%   BMI 27.07 kg/m²   Estimated body mass index is 27.07 kg/m² as calculated from the following:    Height as of this encounter: 162.6 cm (64.02\").    Weight as of this encounter: 71.6 kg (157 lb 12.8 oz).            Physical Exam  Vitals and nursing note reviewed.   Constitutional:       General: He is not in acute distress.     " Appearance: Normal appearance.   HENT:      Head: Normocephalic and atraumatic.      Right Ear: Tympanic membrane normal.      Left Ear: Tympanic membrane normal.      Nose: Nasal tenderness and mucosal edema present. No rhinorrhea.      Comments: Nasal soreness/pain inside left nare- mild redness/edema- no bleeding or scabbing   Pain/discomfort palpation over tip of nose. No lesions      Mouth/Throat:      Mouth: Mucous membranes are moist.   Eyes:      Extraocular Movements: Extraocular movements intact.      Conjunctiva/sclera: Conjunctivae normal.   Cardiovascular:      Rate and Rhythm: Normal rate and regular rhythm.      Heart sounds: Normal heart sounds.   Pulmonary:      Effort: Pulmonary effort is normal. No respiratory distress.      Breath sounds: Normal breath sounds.   Abdominal:      General: Bowel sounds are normal.      Palpations: Abdomen is soft.   Skin:     General: Skin is warm and dry.   Neurological:      General: No focal deficit present.      Mental Status: He is alert and oriented to person, place, and time.          Procedures     Assessment and Plan   Diagnosis Discussed   Continue to monitor   Plenty of fluids  Please complete full course of antibiotics - topical ointment   Follow up with ENT as directed as discussed   If symptoms worsen or persist please seek further evaluation     1. Nasal sore  - mupirocin (BACTROBAN) 2 % ointment; Apply 1 Application topically to the appropriate area as directed 2 (Two) Times a Day for 10 days. To wound area until healed  Dispense: 22 g; Refill: 1  - Ambulatory Referral to ENT (Otolaryngology)    2. Abnormal smell  - Ambulatory Referral to ENT (Otolaryngology)    3. Essential hypertension  BP stable. Mildly elevated. Continue current medications   Will monitor        Follow Up  Return if symptoms worsen or fail to improve, for Next scheduled follow up.    Destiny Rai MD  [facility]  [address]  [facility] [address]  [phone]